# Patient Record
Sex: FEMALE | Race: WHITE | URBAN - METROPOLITAN AREA
[De-identification: names, ages, dates, MRNs, and addresses within clinical notes are randomized per-mention and may not be internally consistent; named-entity substitution may affect disease eponyms.]

---

## 2022-03-28 ENCOUNTER — INITIAL CONSULT (OUTPATIENT)
Dept: SURGERY | Age: 57
End: 2022-03-28
Payer: COMMERCIAL

## 2022-03-28 DIAGNOSIS — C44.319 BASAL CELL CARCINOMA OF CHIN: Primary | ICD-10-CM

## 2022-03-28 PROCEDURE — 99204 OFFICE O/P NEW MOD 45 MIN: CPT | Performed by: DERMATOLOGY

## 2022-03-28 RX ORDER — ESCITALOPRAM OXALATE 5 MG/1
5 TABLET ORAL DAILY
COMMUNITY
Start: 2022-01-05

## 2022-03-28 NOTE — PROGRESS NOTES
[facility]) Dermatology  [facility] [place]. Christy Marshall, [address] [facility] [place]  1965    49 y.o. female     Date of Visit: 3/28/2022    Chief Complaint: Biopsy proven nodular Infiltrative Basal Cell Carcinoma on the Chin. Biopsy performed on 1/21/2022. History of Present Illness:  1. The patient presents today, referred by Yumiko Arboleda CNP, to discuss management options of the  above. The patient reports the following symptoms associated with the lesions:  none. The lesion has not been treated previously. Review of Systems:  Constitutional: Reports general sense of well-being. Skin: Denies any new or changing moles. No tendency to develop thick scars. Heme: Denies abnormal bleeding/bruising. Negative for adenopathy. Past Medical History, Surgical History, Family History, Medications and Allergies reviewed. Pre-procedure screen documented at today's visit by nursing staff reviewed and any contraindications to surgery noted below. Social History: nonsmoker    Physical Examination       -General: Well-appearing, NAD  1. On the chin just left of midline and just below mental crease is an 8mm pearly papule    Assessment and Plan     1. Basal cell carcinoma of chin        AP:  1. NUMBER AND COMPLEXITY OF PROBLEMS ADDRESSED  Acute illness or injury: yes - focally infiltrating nodular bcc chin   Chronic illness:  no  Treatment: Decision for Mohs surgery with local flap or graft reconstruction       Discussed alternative treatments with patient including risks and benefits, which include radiation treatment, excision, curettage, oral therapeutic agents such as erevidge. Indications for Mohs surgery were reviewed and include location. The Mohs AUC score is calculated to be: 8 which indicates that Mohs surgery is appropriate and indicated for this patients lesion.   Based on this and the benefits of Mohs surgery over the above options in this patient's case, I recommend Mohs surgery as the optimal treatment. 2. DATA ANALYSIS:   Tests ordered and reviewed:  Pathology report reviewed: yes - see above  Slides ordered for consult: no  External notes reviewed: yes - Noemi nunez's note from day of bx reviewed, no previous tx, present for at leat 1 yr  Photo reviewed: yes -    Imaging/Labs ordered or reviewed (I.e. INR, CBC, CT, MRI, PET): no  Independent historian:  no     Independent interpretation of tests (personally looked at pathologic slides):   no    Discussion of management with another health care provider: no     3. RISKS OF COMPLICATIONS AND/OR MORBIDITY OR MORTALITY OF PT MANAGEMENT  Decision regarding elective major surgery: The details of Mohs surgery and repair options were discussed with the patient and all questions were answered. The patient opted to proceed with scheduling Mohs surgery as soon as possible. The patient understands to call with any changes to his/her medical condition prior to surgery, including any rapid growth of the lesion discussed today or any change in medications. Repair options discussed in detail and illustrated for the patient either with a drawing or representative photos of similar reconstructions in other cases. I informed the patient that based on size and location the following would be the most likely options, although this could change depending on the final size and shape of the defect following cancer extirpation:    Unilateral Advancement Flap     Risks of surgery were discussed, including but not limited to bleeding, infection, sensory nerve damage, motor nerve damage, numbness, tingling, pain, need for further procedures including scar revision surgery or other scar improvement procedures, referral to other specialties for additional procedures, cosmetic outcome that does not align with patient expectation. All questions answered in regards to the need for surgery and the possible reconstructive modalities necessary.      High risk

## 2022-05-18 ENCOUNTER — TELEPHONE (OUTPATIENT)
Dept: SURGERY | Age: 57
End: 2022-05-18

## 2022-05-18 NOTE — TELEPHONE ENCOUNTER
Pt called stating she tested positive for Covid and she's cancelling 5/19 Mohs appt; she has rescheduled to 7/28 Thursday at 8 a.m. Pt was offered earlier appts but due to summer travel plans she could not accept them. Keep chart for future appt.

## 2022-07-28 ENCOUNTER — TELEPHONE (OUTPATIENT)
Dept: SURGERY | Age: 57
End: 2022-07-28

## 2022-07-28 ENCOUNTER — PROCEDURE VISIT (OUTPATIENT)
Dept: SURGERY | Age: 57
End: 2022-07-28
Payer: COMMERCIAL

## 2022-07-28 VITALS — SYSTOLIC BLOOD PRESSURE: 131 MMHG | HEART RATE: 71 BPM | DIASTOLIC BLOOD PRESSURE: 86 MMHG

## 2022-07-28 DIAGNOSIS — C44.319 BASAL CELL CARCINOMA OF CHIN: Primary | ICD-10-CM

## 2022-07-28 PROCEDURE — 14040 TIS TRNFR F/C/C/M/N/A/G/H/F: CPT | Performed by: DERMATOLOGY

## 2022-07-28 PROCEDURE — 17311 MOHS 1 STAGE H/N/HF/G: CPT | Performed by: DERMATOLOGY

## 2022-07-28 NOTE — PATIENT INSTRUCTIONS
[facility] Mohs Surgery Office Hours:    Monday-Thursday  7:30 AM-4:30 PM    Friday  9:00 AM-1:00 PM     POST-OPERATIVE CARE FOR LIQUID SKIN ADHESIVE             Bandage change after 24 hours    During your procedure today, a liquid skin adhesive was used to close the wound. You do not have to have stiches removed. If has a clear to light purple shiny surface. You do not have to have this removed. It will dissolve (melt away) in about 1-2 weeks. Please follow these instructions to help you recover from your procedure and help your wound heal.    CARING FOR YOUR SURGICAL SITE  The bandage should remain on and completely dry for 24 hours. Do NOT get the bandage wet. After the first 24 hours, gently remove the remaining part of the bandage. It can be helpful to moisten the bandage edges in the shower. Be gentle around the area of the wound. Do not scrub, rub or pick at the skin glue. It will gradually dissolve in 1-2 weeks. Do not shave directly over the wound for one week. You can shave around the area. After one week you can start cleaning the area gently and resume all normal activity. No further restrictions. Use Sunscreen with SPF of at least 30 on the area around the wound. If the dressing comes off or if you have questions, or concerns about the dressing, please call the office for instructions! POST OPERATIVE INSTRUCTIONS    Activity: it is recommended to avoid strenuous activity such as lifting, pushing, pulling, running, power walking or contact sports for at least 2-7 days or as recommended by your provider. Eating and drinking: Do not drink alcohol for 48 hours after your procedure. Alcohol increases the chances of bleeding. Medicines   -If you have discomfort, take Acetaminophen (Tylenol or Extra Strength Tylenol). Follow the instructions and warning on the bottle. Bleeding: If bleeding occurs, Put firm pressure on the area with gauze for 20 minutes without peeking.  If the bleeding continues you may remove the bandage to locate where the bleeding is coming from and apply pressure for another 20 minutes with gauze and an ice pack. If the bleeding does not stop after you apply pressure and ice pack, call us right away. If you call after hours a call service will transfer you to the physician. If you cannot call or get through to the doctor, go to the nearest emergency room or urgent care facility. What to expect:  You may have these symptoms. They are normal and should get better with time:  Swelling. Swelling usually increases for the first 48 hours after your procedure and then begins to improve. Some soreness and redness around your wound. If we worked close to your eyes (forehead, nose, temple, or upper cheeks) your eyes may become swollen and/ or black and blue. Bruising, which could last 1 week or more. Pink and bumpy appearance to the scar. This may happen a few weeks after your procedure. After 4 weeks, you may gently massage the area each day with facial moisturizer or petroleum jelly (Vaseline or Aquaphor). This will help to smooth the skin and improve the appearance of the scar. The color of your scar will fade over time but may be pink for several months after the procedure. The scar may take 6 months to 1 year to reach its final color and appearance. \"Spitting\" suture. Occasionally, an inside suture (stitch) does not completely dissolve. When this happens, (generally 4-8 weeks after surgery), it causes a bump or \"pimple\" to form on the scar. This is easily removed and is not at all serious. It does not mean the skin cancer has returned. Contact us if it happens, but do not be alarmed. Vitamin E oil is NOT necessary. A good moisturizer is just as effective. Sunscreen IS necessary. Use at least and SPF 30 sunscreen daily- even in winter    [address] [place] [address]  -Scars take from 6 months to 1 year to fully mature.  After the area has healed, it may be helpful to gently massage the area with a moisturizer, petroleum jelly (Vaseline) or Aquaphor. This helps to soften the scar tissue. You can begin this 1 month after the day of your surgery. -A Silicone scar gel product may also be beneficial in regards to scar appearance. This can be used but is not usually necessary.  -The color of the scar will even out over time, but may remain pink for several months. Call us at [phone] right away if you have any of the following symptoms:  -Bleeding that you cannot stop (see highlighted area above)   -Pain that lasts longer than 48 hours  -Your wound becomes more painful, red or hot to touch  -Bruising and swelling that does not begin to improve within the 48 hours or gets worse suddenly.

## 2022-07-28 NOTE — TELEPHONE ENCOUNTER
The patient has a 2nd referral for BCC superficial on her R lateral neck. The patient decided to proceed having MILTON Patricio to treat this site and I had spoke with office staff to let her know that pt decided to return to their office to have this treated.

## 2022-07-28 NOTE — PROGRESS NOTES
MOHS PROCEDURE NOTE    PHYSICIAN:  Milton Serrano. sAhley Núñez MD, Who operated in two distinct and integrated capacities as the surgeon removing the tissue and as the pathologist examining the tissue. ASSISTANT: Crista Hester RN, Seema Bates [place]     REFERRING PROVIDER:  REBECCA [place]    PREOPERATIVE DIAGNOSIS: Infiltrative Basal Cell Carcinoma     SPECIFIC MOHS INDICATIONS:  location, aggressive histologic growth pattern, and need for tissue conservation    AUC SCORIN/9    POSTOPERATIVE DIAGNOSIS: SAME    LOCATION: Chin    OPERATIVE PROCEDURE:  MOHS MICROGRAPHIC SURGERY    RECONSTRUCTION OF DEFECT:  Bilateral advancement flap    PREOPERATIVE SIZE: 6x5 MM    DEFECT SIZE: 10x8 MM    LENGTH OF REPAIRED WOUND/SIZE OF FLAP/SIZE OF GRAFT:  3.2cm2    ANESTHESIA:  5.5mL 1% lidocaine with epinephrine 1:100,000 buffered. EBL:  MINIMAL    DURATION OF PROCEDURE:  1 HOUR    POSTOPERATIVE OBSERVATION: 1 HOUR    SPECIMENS:  SEE MOHS MAP    COMPLICATIONS:  NONE    DESCRIPTION OF PROCEDURE:  The patient was given a mirror, as appropriate, and the biopsy site was identified, marked with a surgical marking pen, and verified by the patient. Options for treatment were discussed and the patient was informed that Mohs surgery was the selected treatment based on its lower recurrence rate, given the features listed above, as compared to other treatment modalities such as excision, radiation, or curettage, and agreed with this treatment plan. Risks and benefits including bruising, swelling, bleeding, infection, nerve injury, recurrence, and scarring were discussed with the patient prior to the procedure and a written consent detailing these and other risks was reviewed with the patient and signed. There was a time out for person and procedure verification. The surgical site was prepped with an antiseptic solution. Application of an antiseptic solution was repeated before each surgical stage.       Stage I:  The clinically-apparent tumor was carefully defined and debulked, determining the edge of the surgical excision. A thin layer of tumor-laden tissue was excised with a narrow margin of normal-appearing skin, using the technique of Mohs. A map was prepared to correspond to the area of skin from which it was excised. Hemostasis was achieved using electrosurgery. The wound was bandaged. The tissue was prepared for the cryostat and sectioned. 1 section(s) prepared. Each section was coded, cut, and stained for microscopic examination. The entire base and margins of the excised piece of tissue were examined by the surgeon. The tissue was examined to the level of subcutaneous fat. Stage I:  Nodular BCC: large basaloid lobules of varying shape and size with peripheral palisading present around the rim of the lobule, with retraction of the tumor lobules from their associated stroma in the dermis that clears on peripheral margin. No tumor was identified at the peripheral margins of stage I of microscopically controlled surgery. DEFECT MANAGEMENT:    REPAIR DESCRIPTION:  Various closure modalities were discussed with the patient, and it was decided that a Bilateral Advancement Flap would best preserve normal anatomic and functional relationships. Additional risks of flap necrosis, irregular scar line and wound dehiscence were discussed. The patient was placed on the procedure room table. The area was anesthetized with 1% lidocaine with epinephrine 1:100,000 buffered, was given a sterile prep using Chlorhexidine gluconate 4% solution  and draped in the usual sterile fashion. Incisions were made in the appropriate fashion for the flap designed. The wound was extensively undermined and meticulous hemostasis were obtained using spot monopolar electrocoagulation. The flap was elevated and advanced into the primary defect.  Subcutaneous dead space and dermis were closed using  5-0  Vicryl and the epidermis was approximated using 5-0 Fast absorbing gut running epidermal sutures. WOUND COVERAGE:  The wound was cleaned with normal saline solution, dried off, Aquaphor ointment was applied, and the wound was covered. A dressing was applied for stabilization and light pressure. The patient was given detailed oral and written instructions on postoperative care. There were no complications. The patient left the Unit in good medical condition. FOLLOW-UP:  The patient will return for scar check in 3-4 weeks.

## 2022-07-29 ENCOUNTER — TELEPHONE (OUTPATIENT)
Dept: SURGERY | Age: 57
End: 2022-07-29

## 2022-07-29 NOTE — TELEPHONE ENCOUNTER
The patient was in the office on 7/28/22 for MOHS procedure located on the chin with bilateral adv flap repair. The patient tolerated the procedure well and left the office in good condition. Pain level on post-operative day 1:  present - adequately treated and level of pain (1-10, 10 severe). Any bleeding episode that required pressure to be held, bandage change or a call to the office or MD?  no     Any other issues?:  no    A post-operative telephone call was placed at 7/29/22 in order to check on the patient's recovery process. The patient reported doing well and had no complaints other than those listed above, if any. All of the patient's questions were answered.

## 2022-08-01 ENCOUNTER — TELEPHONE (OUTPATIENT)
Dept: SURGERY | Age: 57
End: 2022-08-01

## 2022-08-01 NOTE — TELEPHONE ENCOUNTER
Call returned and spoke to PT going over the AVS instructions again. She stated the area is red but not severely and has clear drainage, not yellow, green or white. No fever/chills/sweats for her body. No pain. I advised if any of the above to give us a call back if any concern or questions. PT understood all info given and will call w/any questions/concerns.

## 2022-08-01 NOTE — TELEPHONE ENCOUNTER
Patient called stating she will like to speak with a nurse regarding her concerns she has about her procedure she had on 7/28/22.   Please c/b to discuss

## 2022-08-04 ENCOUNTER — TELEPHONE (OUTPATIENT)
Dept: SURGERY | Age: 57
End: 2022-08-04

## 2022-08-04 NOTE — TELEPHONE ENCOUNTER
Patient lvm @10:47 stating she had mohs surgery on 7/28 and has a few questions for the nurse regarding her wound and the progress it's making and also how it looks right now. Pt says she's going to be on a conference call between 11 and 12 but she's free to talk afterwards.

## 2022-08-04 NOTE — TELEPHONE ENCOUNTER
Pt called back and left voice message again stating that she sent photos through Epic. Please check and contact pt.

## 2022-08-04 NOTE — TELEPHONE ENCOUNTER
Informed pt that her photo appears normal for being one week out from surgery date. Denies s/s of infection. Pt aware she can call back if s/s arise. Patient verbalizes understanding of all of the above, and has no further questions or concerns at this time. Encouraged to call back the office should any questions/concerns arise.  8/4/2022 at 4:10 PM.

## 2022-08-04 NOTE — TELEPHONE ENCOUNTER
The patient was in the office on 7/28/22 for MOHS procedure located on the chin with bilateral adv flap repair. The patient tolerated the procedure well and left the office in good condition. Pain level on post-operative day 1:  none and level of pain (1-10, 10 severe). Any bleeding episode that required pressure to be held, bandage change or a call to the office or MD?  no     Any other issues?:  yes - patient states the incision doesn't look right, \"grape-like\" swollen areas. A post-operative telephone call was placed at 8/4/22 and instructed patient to send pictures of chin to [email or web address] and that staff will return call after doctor reviews the pictures.

## 2022-08-24 ENCOUNTER — OFFICE VISIT (OUTPATIENT)
Dept: SURGERY | Age: 57
End: 2022-08-24
Payer: COMMERCIAL

## 2022-08-24 DIAGNOSIS — Z51.89 VISIT FOR WOUND CHECK: Primary | ICD-10-CM

## 2022-08-24 PROCEDURE — 99024 POSTOP FOLLOW-UP VISIT: CPT | Performed by: DERMATOLOGY

## 2022-08-24 NOTE — PATIENT INSTRUCTIONS
[facility] Mohs Surgery Office Hours:    Monday-Thursday  7:30 AM-4:30 PM    Friday  9:00 AM-1:00 PM    Biocorneum and strataderm

## 2022-08-24 NOTE — PROGRESS NOTES
S: The patient is here for scar check s/p Mohs on the Chin with Bilateral Advancement Flap repair, 4 weeks ago. The patient c/o some elevation of scar. O: The scar is well-approximated and shows no signs of abnormal healing (expected amount of erythema, fullness and coloration), looks great. Normal amount of elevation for 4 weeks post ope  A/P:scar looks very good for this phase - start massage  Patient questions answered and expectations reviewed. The patient is scheduled for f/u scar check in 2 months and skin examination with General Dermatology per their recommendations. Electronically signed by Sheri Kelly RN on 8/24/2022 at 3:47 PM    Spenser MARROQUIN RN am scribing in the presence of Dr. Ashutosh Wick 8/24/2022, 3:47 PM.    Mary Arndt MD,  personally performed the services described in this documentation as scribed by Dot Galicia RN  in my presence, and it is both accurate and complete.      Electronically signed by Louise Russo MD on 8/25/2022 at 11:56 AM

## 2022-10-19 ENCOUNTER — OFFICE VISIT (OUTPATIENT)
Dept: SURGERY | Age: 57
End: 2022-10-19

## 2022-10-19 DIAGNOSIS — L90.5 SCAR: Primary | ICD-10-CM

## 2022-10-19 PROCEDURE — 99024 POSTOP FOLLOW-UP VISIT: CPT | Performed by: DERMATOLOGY

## 2022-10-19 NOTE — PATIENT INSTRUCTIONS
[facility] Mohs Surgery Office Hours:    Monday-Thursday  7:30 AM-4:30 PM    Friday  9:00 AM-1:00 PM          Wound Care Massaging Instruction (post Kenalog)    Starting at approximately about 4 weeks following surgery, we often ask that our patients begin massaging their wound on a regular basis. We suggest just a few minutes once a day. It is helpful to use an emollient such as Aquaphor, Vaseline or Eucerin cream.  The sutures that were placed underneath the surface of the skin take about 70 days to dissolve, and during that time, they can cause lumps along the line of the scar. These lumps will eventually go away on their own, but the use of regular massage will help speed the process as well as help soften the scar tissue more quickly. Please continue to use sunscreen, SPF 45 or higher during this time.

## 2022-10-19 NOTE — PROGRESS NOTES
S: The patient is here for scar check s/p Mohs on the chin with Bilateral Advancement Flap repair, 3 months ago. The patient feels like the area is firm and slightly red. O: The scar is well-approximated and shows no signs of abnormal healing (expected amount of erythema, fullness and coloration), scar in center is slightly elevated and firm. Mild erythema. A/P: D/w patient intralesional Kenalog injection to help flatten and soften the scar. The patient opted to proceed with injection. 0.5 of 20mg/cc  ILK injected into scar. Instructed to massage. Patient questions answered and expectations reviewed. The patient is scheduled for f/u scar check in 6 weeks, may consider repeat injection or vbeam laser and skin examination with General Dermatology per their recommendations. Sam Jules RN, am scribing for and in the presence of Dr. Marilyn Koenig. Electronically signed by Erinn Salomon RN on 10/19/2022 at 2:59 PM     I, Dieter Odonnell MD,  personally performed the services described in this documentation as scribed by Erinn Salomon RN  in my presence, and it is both accurate and complete.      Electronically signed by Cyndee Aburto MD on 10/19/2022 at 3:27 PM

## 2022-11-28 ENCOUNTER — TELEPHONE (OUTPATIENT)
Dept: SURGERY | Age: 57
End: 2022-11-28